# Patient Record
Sex: MALE | Race: ASIAN | NOT HISPANIC OR LATINO
[De-identification: names, ages, dates, MRNs, and addresses within clinical notes are randomized per-mention and may not be internally consistent; named-entity substitution may affect disease eponyms.]

---
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
## 2023-04-17 ENCOUNTER — APPOINTMENT (OUTPATIENT)
Dept: ORTHOPEDIC SURGERY | Facility: CLINIC | Age: 18
End: 2023-04-17
Payer: MEDICAID

## 2023-04-17 ENCOUNTER — NON-APPOINTMENT (OUTPATIENT)
Age: 18
End: 2023-04-17

## 2023-04-17 VITALS
SYSTOLIC BLOOD PRESSURE: 133 MMHG | HEIGHT: 67 IN | DIASTOLIC BLOOD PRESSURE: 91 MMHG | HEART RATE: 76 BPM | BODY MASS INDEX: 27.67 KG/M2 | WEIGHT: 176.31 LBS

## 2023-04-17 DIAGNOSIS — M25.569 PAIN IN UNSPECIFIED KNEE: ICD-10-CM

## 2023-04-17 PROBLEM — Z00.00 ENCOUNTER FOR PREVENTIVE HEALTH EXAMINATION: Status: ACTIVE | Noted: 2023-04-17

## 2023-04-17 PROCEDURE — 73560 X-RAY EXAM OF KNEE 1 OR 2: CPT | Mod: LT

## 2023-04-17 PROCEDURE — 99205 OFFICE O/P NEW HI 60 MIN: CPT

## 2023-04-17 NOTE — DISCUSSION/SUMMARY
[de-identified] : 17y healthy/active male pw L ACL tear, medial meniscus ramp lesion and possible lateral meniscus posterior horn tear.  The patient was extensively counseled on treatment options including but not limited to observation, rest/activity modification, bracing, anti-inflammatory medications, physical therapy, injections, and surgery.  The natural history of the disease was thoroughly explained.  \par \par The risks, benefits and alternatives to surgery were discussed.  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  He states that returning to [facility] is extremely important to him and would like to proceed with an ACL reconstruction.  Regarding the meniscus injury, we discussed that chondroprotective biomechanics of an intact meniscus.  We discussed that certain patterns of injuries are more amenable to repair given their location, blood supply, size and pattern.  We discussed that every attempt will be made to repair the meniscus should the injury be reparable.  We also discussed that irreparable injuries will be debrided to prevent mechanical symptoms but that deficiencies may accelerate arthritis and arthrosis.\par We discussed the distinct graft choices available.  I advised against allograft given the higher re-rupture rates in younger patients.  We discussed that autologous hamstring grafts may require allograft augmentation to reach a sufficient diameter and the reach if nerve injury upon harvest along with weakness in deep flexion.  We discussed potential extensor weakness following autologous quadriceps harvest.  We also discussed potential anterior knee pain, kneeling pain, and patellar fracture with bone-patellar-bone harvest.\par  The patient understands the risks include but are not limited to bleeding, infection, wound healing issues, damage to surrounding structures including nerves and arteries, the need for re-rupture of the ACL and tearing of the meniscus, revision surgery, symptomatic hardware, contralateral ACL rupture, and the inability of the surgery to reduce the pain.  No guarantees were given regarding the surgery.  They understand there is a risk of loss of limb, extremity and life.\par \par We discussed the proposed rehabilitation timeline as well as expected postoperative restrictions. The patient voiced a good understanding of treatment options, risks and benefits, postoperative instructions, rehabilitation timeline, and restrictions. The patient was given the opportunity to ask questions, which were all answered to the best of my ability and to their satisfaction. The patient will work with my office to arrange a time for surgery and obtain any medical clearance information necessary. The patient expressed understanding of his diagnosis and treatment plan and all questions were answered. \par \par He is returning to [place] for his final exams - we will begin prehab to restore full extension and I will re-evaluate him prior to surgery to finalize his graft decision and select a surgial date.\par \par -Hinged knee brace, crutches\par -rest/ice/elevate\par -nsaid prn\par -PT prehab\par -RTC 3w upon return from exams\par \par \par I have personally obtained the history, reviewed the ROS as noted, and performed the physical examination today.  The patient and I discussed the assessment and options and developed the plan.  All questions were answered and the patient stated their understanding of the treatment plan and appreciation of the visit.\par \par My cumulative time spent on this patient's visit was over 60 minutes and included: Preparation for the visit, review of the medical records, review of pertinent diagnostic studies, examination and counseling of the patient on the above diagnosis, treatment plan and prognosis, orders of diagnostic tests, surgical discussions, surgical booking, medications and/or appropriate procedures and documentation in the medical records of today's visit. \par \par Kenn Rojas MD

## 2023-04-17 NOTE — HISTORY OF PRESENT ILLNESS
[de-identified] : 18yo very pleasant male pw L knee pain from an injury last week.  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  He noted a popping sensation and immediate pain and swelling.  He initially thought it was a sprain but the level of swelling caused him to see his PMD, who ordered an MRI that revealed an ACL tear and a meniscus tear.  He has no history of knee or sports injuries, he has never had surgery.  He notes the pain is mild and he has been taking tylenol when needed.  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  He is returning to [place] for the next 2 weeks for his final exams.  No numbness/paresthesias.  He has not been using a brace.\par Referred by Dr. Bustillo.

## 2023-04-17 NOTE — PHYSICAL EXAM
[de-identified] : Gen: NAD\par Resp: Nonlabored respirations, no SOB\par Gait: antalgic\par \par L Knee:\par Skin intact\par Moderate effusion\par 10-90 AROM\par Tender MJL + LJL\par Firing IP Q EHL TA GS\par SILT L3-S1\par 2+ dp bcr\par \par 2B lachman and (+) pivot shift\par Grade 1 posterior drawer\par Stable to v/v at 0 and 30 degrees\par (-) Dial test at 30, 90 degrees\par \par (+) Ursula, unable to perform Thessaly\par \par 1+ patellar translation\par (-) pain with patellar compression/grind\par (-) J sign\par (-) apprehension  [de-identified] : The following radiographs were ordered and read by me during this patient's visit. I reviewed each radiograph in detail with the patient and discussed the findings as highlighted below.   2 views of the L knee were obtained today that show no fracture, or dislocation. There are no degenerative changes seen. There is no malalignment. No obvious osseous abnormality. Otherwise unremarkable. \par \par I independently reviewed and interpreted the MRI of the L knee from [facility] 4/14.  I discussed with the patient the MRI demonstrates a complete ACL tear with classic bone bruising, low grade sprain of the MCL and LCL, a ramp lesion of the medial meniscus and a possible posterior horn lateral meniscus tear.

## 2023-04-24 ENCOUNTER — APPOINTMENT (OUTPATIENT)
Dept: ORTHOPEDIC SURGERY | Facility: CLINIC | Age: 18
End: 2023-04-24
Payer: MEDICAID

## 2023-04-24 VITALS — SYSTOLIC BLOOD PRESSURE: 132 MMHG | HEART RATE: 82 BPM | DIASTOLIC BLOOD PRESSURE: 92 MMHG

## 2023-04-24 PROCEDURE — 99214 OFFICE O/P EST MOD 30 MIN: CPT

## 2023-04-27 NOTE — PHYSICAL EXAM
[de-identified] : Gen: NAD\par Resp: Nonlabored respirations, no SOB\par Gait: antalgic\par \par L Knee:\par Skin intact\par Moderate effusion\par 0-100 AROM\par Tender MJL + LJL\par Firing IP Q EHL TA GS\par SILT L3-S1\par 2+ dp bcr\par \par 2B lachman and (+) pivot shift\par Grade 1 posterior drawer\par Stable to v/v at 0 and 30 degrees\par (-) Dial test at 30, 90 degrees\par \par (+) Ursula, unable to perform Thessaly\par \par 1+ patellar translation\par (-) pain with patellar compression/grind\par (-) J sign\par (-) apprehension  [de-identified] : The following radiographs were ordered and read by me during this patient's visit. I reviewed each radiograph in detail with the patient and discussed the findings as highlighted below.   2 views of the L knee were obtained today that show no fracture, or dislocation. There are no degenerative changes seen. There is no malalignment. No obvious osseous abnormality. Otherwise unremarkable. \par \par I independently reviewed and interpreted the MRI of the L knee from [facility] 4/14.  I discussed with the patient the MRI demonstrates a complete ACL tear with classic bone bruising, low grade sprain of the MCL and LCL, a ramp lesion of the medial meniscus and a possible posterior horn lateral meniscus tear.

## 2023-04-27 NOTE — DISCUSSION/SUMMARY
[de-identified] : 17y healthy/active male pw L ACL tear, medial meniscus ramp lesion and possible lateral meniscus posterior horn tear.  The patient was extensively counseled on treatment options including but not limited to observation, rest/activity modification, bracing, anti-inflammatory medications, physical therapy, injections, and surgery.  The natural history of the disease was thoroughly explained.  \par \par The risks, benefits and alternatives to surgery were discussed.  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  He states that returning to [facility] is extremely important to him and would like to proceed with an ACL reconstruction.  Regarding the meniscus injury, we discussed that chondroprotective biomechanics of an intact meniscus.  We discussed that certain patterns of injuries are more amenable to repair given their location, blood supply, size and pattern.  We discussed that every attempt will be made to repair the meniscus should the injury be reparable.  We also discussed that irreparable injuries will be debrided to prevent mechanical symptoms but that deficiencies may accelerate arthritis and arthrosis.\par We discussed the distinct graft choices available.  I advised against allograft given the higher re-rupture rates in younger patients.  We discussed that autologous hamstring grafts may require allograft augmentation to reach a sufficient diameter and the reach if nerve injury upon harvest along with weakness in deep flexion.  We discussed potential extensor weakness following autologous quadriceps harvest.  We also discussed potential anterior knee pain, kneeling pain, and patellar fracture with bone-patellar-bone harvest.\par  The patient understands the risks include but are not limited to bleeding, infection, wound healing issues, damage to surrounding structures including nerves and arteries, re-rupture of the ACL and tearing of the meniscus, revision surgery, symptomatic hardware, contralateral ACL rupture, and the inability of the surgery to reduce the pain.  No guarantees were given regarding the surgery.  They understand there is a risk of loss of limb, extremity and life.\par \par We discussed the proposed rehabilitation timeline as well as expected postoperative restrictions. The patient voiced a good understanding of treatment options, risks and benefits, postoperative instructions, rehabilitation timeline, and restrictions. The patient was given the opportunity to ask questions, which were all answered to the best of my ability and to their satisfaction. The patient will work with my office to arrange a time for surgery and obtain any medical clearance information necessary. The patient expressed understanding of his diagnosis and treatment plan and all questions were answered. \par \par He is returning to [place] for his final exams - we will begin prehab to restore full extension and I will re-evaluate him prior to surgery to finalize his graft decision and select a surgical date.\par \par He would like to proceed with a left knee arthroscopic assisted anterior cruciate ligament reconstruction with quadriceps autograft, possible allograft, and meniscus repair versus debridement\par \par \par -Hinged knee brace, crutches\par -rest/ice/elevate\par -nsaid prn\par -PT prehab\par \par \par \par I have personally obtained the history, reviewed the ROS as noted, and performed the physical examination today.  The patient and I discussed the assessment and options and developed the plan.  All questions were answered and the patient stated their understanding of the treatment plan and appreciation of the visit.\par \par My cumulative time spent on this patient's visit was over 60 minutes and included: Preparation for the visit, review of the medical records, review of pertinent diagnostic studies, examination and counseling of the patient on the above diagnosis, treatment plan and prognosis, orders of diagnostic tests, surgical discussions, surgical booking, medications and/or appropriate procedures and documentation in the medical records of today's visit. \par \par Kenn Rojas MD

## 2023-04-27 NOTE — HISTORY OF PRESENT ILLNESS
[de-identified] : 18yo very pleasant male pw L knee pain from an injury last week.  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  He noted a popping sensation and immediate pain and swelling.  He initially thought it was a sprain but the level of swelling caused him to see his PMD, who ordered an MRI that revealed an ACL tear and a meniscus tear.  He has no history of knee or sports injuries, he has never had surgery.  He notes the pain is mild and he has been taking tylenol when needed.  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  He is returning to [place] for the next 2 weeks for his final exams.  No numbness/paresthesias.  He has not been using a brace.\par Referred by Dr. Bustillo.\par \par 4/24 interval - he returns having worked on his knee ROM and feels almost no pain today.  He has researched graft choices and has several questions.  He has been icing his knee

## 2023-05-01 ENCOUNTER — OUTPATIENT (OUTPATIENT)
Dept: OUTPATIENT SERVICES | Facility: HOSPITAL | Age: 18
LOS: 1 days | End: 2023-05-01
Payer: MEDICAID

## 2023-05-01 VITALS
TEMPERATURE: 98 F | HEART RATE: 76 BPM | RESPIRATION RATE: 14 BRPM | WEIGHT: 147.71 LBS | SYSTOLIC BLOOD PRESSURE: 106 MMHG | HEIGHT: 67.72 IN | OXYGEN SATURATION: 98 % | DIASTOLIC BLOOD PRESSURE: 72 MMHG

## 2023-05-01 DIAGNOSIS — Z01.818 ENCOUNTER FOR OTHER PREPROCEDURAL EXAMINATION: ICD-10-CM

## 2023-05-01 DIAGNOSIS — M25.569 PAIN IN UNSPECIFIED KNEE: ICD-10-CM

## 2023-05-01 PROCEDURE — G0463: CPT

## 2023-05-01 NOTE — H&P PST ADULT - ASSESSMENT
16 y/o male with left knee pain  Planned surgery.- left knee arthrosocpy  Pre op instructions provided-wash, NPO for surgery  Instructions provided on medications to continue 18 y/o male with left knee pain  Planned surgery.- left knee arthroscopy  Pre op instructions provided-wash, NPO for surgery  Instructions provided on medications to continue

## 2023-05-01 NOTE — H&P PST ADULT - HISTORY OF PRESENT ILLNESS
18 y/o male with left knee injury while playing sports at school 1 month ago. Pain since then. Pain with activities with weight bearing and not taking any pain meds. MRI revealed Medial meniscus tear and was advised surgery

## 2023-05-01 NOTE — H&P PST ADULT - ATTENDING COMMENTS
17y healthy male presenting with left knee anterior cruciate ligament tear and likely medial meniscus tear.  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  On exam, 2B lachman (+) pivot shift, otherwise ligamentously intact, (+) [facility], NVI.  Plan for left knee anterior cruciate ligament reconstruction with quadriceps autograft, possible allograft, meniscus repair versus debridement

## 2023-05-01 NOTE — H&P PST ADULT - TEMPERATURE IN CELSIUS (DEGREES C)
Impression: Type 2 diabetes mellitus w/o complication: T52.8. Plan: Diabetes type II: no background retinopathy, no signs of neovascularization noted. Discussed ocular and systemic benefits of blood sugar control. 36.7

## 2023-05-04 ENCOUNTER — TRANSCRIPTION ENCOUNTER (OUTPATIENT)
Age: 18
End: 2023-05-04

## 2023-05-05 ENCOUNTER — APPOINTMENT (OUTPATIENT)
Dept: ORTHOPEDIC SURGERY | Facility: HOSPITAL | Age: 18
End: 2023-05-05

## 2023-05-05 ENCOUNTER — OUTPATIENT (OUTPATIENT)
Dept: OUTPATIENT SERVICES | Facility: HOSPITAL | Age: 18
LOS: 1 days | End: 2023-05-05
Payer: COMMERCIAL

## 2023-05-05 ENCOUNTER — TRANSCRIPTION ENCOUNTER (OUTPATIENT)
Age: 18
End: 2023-05-05

## 2023-05-05 VITALS
OXYGEN SATURATION: 96 % | HEART RATE: 77 BPM | DIASTOLIC BLOOD PRESSURE: 83 MMHG | SYSTOLIC BLOOD PRESSURE: 130 MMHG | RESPIRATION RATE: 16 BRPM

## 2023-05-05 VITALS
OXYGEN SATURATION: 99 % | DIASTOLIC BLOOD PRESSURE: 80 MMHG | RESPIRATION RATE: 221 BRPM | SYSTOLIC BLOOD PRESSURE: 128 MMHG | HEIGHT: 69 IN | HEART RATE: 64 BPM | TEMPERATURE: 98 F | WEIGHT: 167.33 LBS

## 2023-05-05 DIAGNOSIS — M25.569 PAIN IN UNSPECIFIED KNEE: ICD-10-CM

## 2023-05-05 PROCEDURE — 97161 PT EVAL LOW COMPLEX 20 MIN: CPT

## 2023-05-05 PROCEDURE — 76000 FLUOROSCOPY <1 HR PHYS/QHP: CPT

## 2023-05-05 PROCEDURE — 29882 ARTHRS KNE SRG MNISC RPR M/L: CPT | Mod: LT

## 2023-05-05 PROCEDURE — 29888 ARTHRS AID ACL RPR/AGMNTJ: CPT | Mod: LT

## 2023-05-05 PROCEDURE — 29888 ARTHRS AID ACL RPR/AGMNTJ: CPT | Mod: AS,LT

## 2023-05-05 PROCEDURE — C1713: CPT

## 2023-05-05 DEVICE — PIN GUIDE FLEX MUST ORDER IN MULT OF 5: Type: IMPLANTABLE DEVICE | Status: FUNCTIONAL

## 2023-05-05 DEVICE — ARTHREX SECONDARY FIXATION WITH PEEK SWIVELOCK ANCHOR 4.75 X 19.1MM: Type: IMPLANTABLE DEVICE | Status: FUNCTIONAL

## 2023-05-05 DEVICE — IMP TIGHTROPE ACL W/FIBERTAG: Type: IMPLANTABLE DEVICE | Status: FUNCTIONAL

## 2023-05-05 DEVICE — IMP TIGHTROPE ABS BUTTON 14MM: Type: IMPLANTABLE DEVICE | Status: FUNCTIONAL

## 2023-05-05 DEVICE — ANCHOR SUT FIBERSTITCH 2-0 CRVD: Type: IMPLANTABLE DEVICE | Status: FUNCTIONAL

## 2023-05-05 RX ORDER — IBUPROFEN 200 MG
1 TABLET ORAL
Qty: 30 | Refills: 0
Start: 2023-05-05

## 2023-05-05 RX ORDER — OXYCODONE HYDROCHLORIDE 5 MG/1
5 TABLET ORAL ONCE
Refills: 0 | Status: DISCONTINUED | OUTPATIENT
Start: 2023-05-05 | End: 2023-05-08

## 2023-05-05 RX ORDER — HYDROMORPHONE HYDROCHLORIDE 2 MG/ML
0.5 INJECTION INTRAMUSCULAR; INTRAVENOUS; SUBCUTANEOUS
Refills: 0 | Status: DISCONTINUED | OUTPATIENT
Start: 2023-05-05 | End: 2023-05-08

## 2023-05-05 RX ORDER — CEFAZOLIN SODIUM 1 G
2000 VIAL (EA) INJECTION ONCE
Refills: 0 | Status: COMPLETED | OUTPATIENT
Start: 2023-05-05 | End: 2023-05-05

## 2023-05-05 RX ORDER — OXYCODONE HYDROCHLORIDE 5 MG/1
10 TABLET ORAL ONCE
Refills: 0 | Status: DISCONTINUED | OUTPATIENT
Start: 2023-05-05 | End: 2023-05-05

## 2023-05-05 RX ORDER — APREPITANT 80 MG/1
40 CAPSULE ORAL ONCE
Refills: 0 | Status: COMPLETED | OUTPATIENT
Start: 2023-05-05 | End: 2023-05-05

## 2023-05-05 RX ORDER — HYDROCODONE BITARTRATE AND ACETAMINOPHEN 7.5; 325 MG/15ML; MG/15ML
1 SOLUTION ORAL
Qty: 28 | Refills: 0
Start: 2023-05-05 | End: 2023-05-11

## 2023-05-05 RX ORDER — HYDROMORPHONE HYDROCHLORIDE 2 MG/ML
0.25 INJECTION INTRAMUSCULAR; INTRAVENOUS; SUBCUTANEOUS
Refills: 0 | Status: DISCONTINUED | OUTPATIENT
Start: 2023-05-05 | End: 2023-05-08

## 2023-05-05 RX ORDER — GABAPENTIN 400 MG/1
1 CAPSULE ORAL
Qty: 42 | Refills: 0
Start: 2023-05-05 | End: 2023-05-18

## 2023-05-05 RX ORDER — ASPIRIN/CALCIUM CARB/MAGNESIUM 324 MG
1 TABLET ORAL
Qty: 56 | Refills: 0
Start: 2023-05-05 | End: 2023-06-01

## 2023-05-05 RX ORDER — SODIUM CHLORIDE 9 MG/ML
1000 INJECTION, SOLUTION INTRAVENOUS
Refills: 0 | Status: DISCONTINUED | OUTPATIENT
Start: 2023-05-05 | End: 2023-05-08

## 2023-05-05 RX ADMIN — SODIUM CHLORIDE 100 MILLILITER(S): 9 INJECTION, SOLUTION INTRAVENOUS at 11:48

## 2023-05-05 RX ADMIN — OXYCODONE HYDROCHLORIDE 10 MILLIGRAM(S): 5 TABLET ORAL at 12:16

## 2023-05-05 RX ADMIN — HYDROMORPHONE HYDROCHLORIDE 0.25 MILLIGRAM(S): 2 INJECTION INTRAMUSCULAR; INTRAVENOUS; SUBCUTANEOUS at 11:55

## 2023-05-05 RX ADMIN — OXYCODONE HYDROCHLORIDE 10 MILLIGRAM(S): 5 TABLET ORAL at 12:46

## 2023-05-05 RX ADMIN — APREPITANT 40 MILLIGRAM(S): 80 CAPSULE ORAL at 07:06

## 2023-05-05 RX ADMIN — HYDROMORPHONE HYDROCHLORIDE 0.25 MILLIGRAM(S): 2 INJECTION INTRAMUSCULAR; INTRAVENOUS; SUBCUTANEOUS at 12:18

## 2023-05-05 NOTE — PHYSICAL THERAPY INITIAL EVALUATION ADULT - DID THE PATIENT HAVE SURGERY?
left knee arthroscopic assisted anterior cruciate ligament reconstruction with quadriceps autograft, possible allograft, meniscus repair vs debridement/yes

## 2023-05-05 NOTE — PHYSICAL THERAPY INITIAL EVALUATION ADULT - NSACTIVITYREC_GEN_A_PT
Pt independent in bed mobility, requires CG x 1 for transfers, Sup x 1 for amb with crutches. Pt edu/instructed on L LE NWB. Although pt does not have stairs, pt instructed on going up/down stairs on bottom. Pt NWB with crutches education handout issued. Pt verbalized family available to assist as needed.

## 2023-05-05 NOTE — PHYSICAL THERAPY INITIAL EVALUATION ADULT - PERTINENT HX OF CURRENT PROBLEM, REHAB EVAL
Pt is a 18 y/o M s/p left knee arthroscopic assisted anterior cruciate ligament reconstruction with quadriceps autograft, possible allograft, meniscus repair vs debridement 5/5/23

## 2023-05-05 NOTE — ASU DISCHARGE PLAN (ADULT/PEDIATRIC) - ASU DC SPECIAL INSTRUCTIONSFT
Please call Dr. Rojas's office for post operative appointment  Left lower extremity is non weight bearing- keep leg in knee immobilizer

## 2023-05-05 NOTE — ASU PATIENT PROFILE, ADULT - FALL HARM RISK - RISK INTERVENTIONS

## 2023-05-05 NOTE — PHYSICAL THERAPY INITIAL EVALUATION ADULT - ADDITIONAL COMMENTS
Pt resides in pvt home with parents and sister, pt verbalized available to assist as needed. Pt denies MARION and stairs throughout household. Pt has crutches, states was using briefly after injury. Pt reports was independent prior to admission.

## 2023-05-05 NOTE — ASU DISCHARGE PLAN (ADULT/PEDIATRIC) - NS MD DC FALL RISK RISK
For information on Fall & Injury Prevention, visit: [email or web address] OR  [email or web address] OR  https://www.cdc.gov/steadi/patient.html

## 2023-05-05 NOTE — BRIEF OPERATIVE NOTE - NSICDXBRIEFPROCEDURE_GEN_ALL_CORE_FT
PROCEDURES:  Arthroscopic repair of ACL of left knee using patellar tendon graft 05-May-2023 11:27:30  Lara Dickson

## 2023-05-18 ENCOUNTER — APPOINTMENT (OUTPATIENT)
Dept: ORTHOPEDIC SURGERY | Facility: CLINIC | Age: 18
End: 2023-05-18
Payer: COMMERCIAL

## 2023-05-18 VITALS — HEART RATE: 89 BPM | SYSTOLIC BLOOD PRESSURE: 111 MMHG | DIASTOLIC BLOOD PRESSURE: 75 MMHG | TEMPERATURE: 97.2 F

## 2023-05-18 PROBLEM — Z87.828 PERSONAL HISTORY OF OTHER (HEALED) PHYSICAL INJURY AND TRAUMA: Chronic | Status: ACTIVE | Noted: 2023-05-01

## 2023-05-18 PROCEDURE — 99024 POSTOP FOLLOW-UP VISIT: CPT

## 2023-05-18 PROCEDURE — 73560 X-RAY EXAM OF KNEE 1 OR 2: CPT | Mod: LT

## 2023-05-18 NOTE — HISTORY OF PRESENT ILLNESS
[de-identified] : 18yo very pleasant male pw L knee pain from an injury last week.  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  He noted a popping sensation and immediate pain and swelling.  He initially thought it was a sprain but the level of swelling caused him to see his PMD, who ordered an MRI that revealed an ACL tear and a meniscus tear.  He has no history of knee or sports injuries, he has never had surgery.  He notes the pain is mild and he has been taking tylenol when needed.  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  He is returning to [place] for the next 2 weeks for his final exams.  No numbness/paresthesias.  He has not been using a brace.\par Referred by Dr. Bustillo.\par \par 4/24 interval - he returns having worked on his knee ROM and feels almost no pain today.  He has researched graft choices and has several questions.  He has been icing his knee\par \par 5/18 interval - Pt returns after quad ACLR + mm repair stating the pain has gradually decreased each day.  Pt denies f/c, cp/sob, numbness/paresthesias, has followed postop instructions regarding rom and weight bearing status, has weaned prescription pain meds almost completely.  No issues with the dressing or wound.

## 2023-05-18 NOTE — PHYSICAL EXAM
[de-identified] : Gen: NAD\par Resp: Nonlabored respirations, no SOB\par Gait: antalgic\par \par L Knee:\par dressing removed and incision healing well, no erythema/fluctuance/purulence/streaking - steristrips placed \par Small effusion\par 10-90 AROM\par Firing IP Q EHL TA GS\par SILT L3-S1\par 2+ dp bcr [de-identified] : The following radiographs were ordered and read by me during this patient's visit. I reviewed each radiograph in detail with the patient and discussed the findings as highlighted below.   2 views of the L knee were obtained today that show no fracture, or dislocation. There are no degenerative changes seen. The tibial and femoral tunnels appear in appropriate location with interference buttons against the anteromedial tibia with an anchor distal to the button and on the lateral femur.\par \par I independently reviewed and interpreted the MRI of the L knee from [facility] 4/14.  I discussed with the patient the MRI demonstrates a complete ACL tear with classic bone bruising, low grade sprain of the MCL and LCL, a ramp lesion of the medial meniscus and a possible posterior horn lateral meniscus tear.

## 2023-05-18 NOTE — DISCUSSION/SUMMARY
[de-identified] : 17y healthy/active male pw L ACL tear, medial meniscus ramp lesion sp autologous quadriceps ACLR + mm repair 5/5.  He is doing well and will begin PT today.\par \par -Hinged knee brace, crutches - NWB 4w\par -rest/ice/elevate\par -nsaid prn\par -RTC 4w\par \par \par I have personally obtained the history, reviewed the ROS as noted, and performed the physical examination today.  The patient and I discussed the assessment and options and developed the plan.  All questions were answered and the patient stated their understanding of the treatment plan and appreciation of the visit.\par \par My cumulative time spent on this patient's visit was over 60 minutes and included: Preparation for the visit, review of the medical records, review of pertinent diagnostic studies, examination and counseling of the patient on the above diagnosis, treatment plan and prognosis, orders of diagnostic tests, surgical discussions, surgical booking, medications and/or appropriate procedures and documentation in the medical records of today's visit. \par \par Kenn Rojas MD

## 2023-06-15 ENCOUNTER — APPOINTMENT (OUTPATIENT)
Dept: ORTHOPEDIC SURGERY | Facility: CLINIC | Age: 18
End: 2023-06-15
Payer: MEDICAID

## 2023-06-15 VITALS — DIASTOLIC BLOOD PRESSURE: 79 MMHG | HEART RATE: 76 BPM | HEIGHT: 67 IN | SYSTOLIC BLOOD PRESSURE: 115 MMHG

## 2023-06-15 PROCEDURE — 99024 POSTOP FOLLOW-UP VISIT: CPT

## 2023-06-15 PROCEDURE — 73560 X-RAY EXAM OF KNEE 1 OR 2: CPT | Mod: LT

## 2023-06-15 NOTE — HISTORY OF PRESENT ILLNESS
[de-identified] : 18yo very pleasant male pw L knee pain from an injury last week.  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  He noted a popping sensation and immediate pain and swelling.  He initially thought it was a sprain but the level of swelling caused him to see his PMD, who ordered an MRI that revealed an ACL tear and a meniscus tear.  He has no history of knee or sports injuries, he has never had surgery.  He notes the pain is mild and he has been taking tylenol when needed.  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  He is returning to [place] for the next 2 weeks for his final exams.  No numbness/paresthesias.  He has not been using a brace.\par Referred by Dr. Bustillo.\par \par 4/24 interval - he returns having worked on his knee ROM and feels almost no pain today.  He has researched graft choices and has several questions.  He has been icing his knee\par \par 5/18 interval - Pt returns after quad ACLR + mm repair stating the pain has gradually decreased each day.  Pt denies f/c, cp/sob, numbness/paresthesias, has followed postop instructions regarding rom and weight bearing status, has weaned prescription pain meds almost completely.  No issues with the dressing or wound. \par \par 6/15 interval - he has been going to PT 5x/w, he has no pain and feels his quadriceps is much stronger, not taking any medications.  He is going to [place] for 1 month at the end of the month for his sister's wedding

## 2023-06-15 NOTE — PHYSICAL EXAM
[de-identified] : Gen: NAD\par Resp: Nonlabored respirations, no SOB\par Gait: crutches \par \par L Knee:\par skin healed\par No effusion\par 0-110 AROM\par 5/5 IP Q EHL TA GS\par SILT L3-S1\par 2+ dp bcr\par \par 1A lachman and (-) pivot shift\par Grade 1 posterior drawer\par Stable to v/v at 0 and 30 degrees\par (-) Dial test at 30, 90 degrees\par \par (-) Ursula, Thessaly\par \par 1+ patellar translation\par (-) pain with patellar compression/grind\par (-) J sign\par (-) apprehension  [de-identified] : The following radiographs were ordered and read by me during this patient's visit. I reviewed each radiograph in detail with the patient and discussed the findings as highlighted below.   2 views of the L knee were obtained today that show no fracture, or dislocation. There are no degenerative changes seen. The tibial and femoral tunnels appear in appropriate location with interference buttons against the anteromedial tibia with a cortical fixation button 1mm from the lateral femur.\par \par I independently reviewed and interpreted the MRI of the L knee from [facility] 4/14.  I discussed with the patient the MRI demonstrates a complete ACL tear with classic bone bruising, low grade sprain of the MCL and LCL, a ramp lesion of the medial meniscus and a possible posterior horn lateral meniscus tear.

## 2023-06-15 NOTE — DISCUSSION/SUMMARY
[de-identified] : 17y healthy/active male pw L ACL tear, medial meniscus ramp lesion sp autologous quadriceps ACLR + mm repair 5/5.  He is doing well with good strength and rom, no pain.  He is going to [place] for 1 month and his mom has located a physical therapist near the site of his sister's wedding, he will f/u upon his return.\par \par -Hinged knee brace - dc when appropriate quad control\par -adv to WBAT\par -rest/ice/elevate\par -nsaid prn\par -RTC after trip to [place]\par \par \par I have personally obtained the history, reviewed the ROS as noted, and performed the physical examination today.  The patient and I discussed the assessment and options and developed the plan.  All questions were answered and the patient stated their understanding of the treatment plan and appreciation of the visit.\par \par My cumulative time spent on this patient's visit was over 60 minutes and included: Preparation for the visit, review of the medical records, review of pertinent diagnostic studies, examination and counseling of the patient on the above diagnosis, treatment plan and prognosis, orders of diagnostic tests, surgical discussions, surgical booking, medications and/or appropriate procedures and documentation in the medical records of today's visit. \par \par Kenn Rojas MD

## 2023-08-21 ENCOUNTER — APPOINTMENT (OUTPATIENT)
Dept: ORTHOPEDIC SURGERY | Facility: CLINIC | Age: 18
End: 2023-08-21

## 2023-09-05 ENCOUNTER — APPOINTMENT (OUTPATIENT)
Dept: ORTHOPEDIC SURGERY | Facility: CLINIC | Age: 18
End: 2023-09-05
Payer: COMMERCIAL

## 2023-09-05 VITALS
WEIGHT: 176 LBS | BODY MASS INDEX: 27.62 KG/M2 | HEIGHT: 67 IN | DIASTOLIC BLOOD PRESSURE: 72 MMHG | SYSTOLIC BLOOD PRESSURE: 116 MMHG | HEART RATE: 76 BPM

## 2023-09-05 PROCEDURE — 99213 OFFICE O/P EST LOW 20 MIN: CPT

## 2023-09-05 NOTE — HISTORY OF PRESENT ILLNESS
[de-identified] : 16yo very pleasant male pw L knee pain from an injury last week.  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  He noted a popping sensation and immediate pain and swelling.  He initially thought it was a sprain but the level of swelling caused him to see his PMD, who ordered an MRI that revealed an ACL tear and a meniscus tear.  He has no history of knee or sports injuries, he has never had surgery.  He notes the pain is mild and he has been taking tylenol when needed.  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  He is returning to [place] for the next 2 weeks for his final exams.  No numbness/paresthesias.  He has not been using a brace. Referred by Dr. Bustillo.  4/24 interval - he returns having worked on his knee ROM and feels almost no pain today.  He has researched graft choices and has several questions.  He has been icing his knee  5/18 interval - Pt returns after quad ACLR + mm repair stating the pain has gradually decreased each day.  Pt denies f/c, cp/sob, numbness/paresthesias, has followed postop instructions regarding rom and weight bearing status, has weaned prescription pain meds almost completely.  No issues with the dressing or wound.   6/15 interval - he has been going to PT 5x/w, he has no pain and feels his quadriceps is much stronger, not taking any medications.  He is going to [place] for 1 month at the end of the month for his sister's wedding  9/5 interval - he has intermittently done some PT but this was interrupted by his month long trip to [place] for his sister's wedding.  He has no pain but he does note some weakness in his leg.  He has started his semester at [employer] and is doing 2x/w PT there

## 2023-09-05 NOTE — DISCUSSION/SUMMARY
[de-identified] : 17y healthy/active male pw L ACL tear, medial meniscus ramp lesion sp autologous quadriceps ACLR + mm repair 5/5.  He is doing well with good strength and rom, no pain.  His PT was interrupted by a 1 month trip to [place] but he has returned to consistent rehab at college and understands the importance of completing this to restore his strenth and stability.  -PT rx renewed -rtc at winter break when home from college, he will contact my office with any interval issues   I have personally obtained the history, reviewed the ROS as noted, and performed the physical examination today.  The patient and I discussed the assessment and options and developed the plan.  All questions were answered and the patient stated their understanding of the treatment plan and appreciation of the visit.  My cumulative time spent on this patient's visit was over 60 minutes and included: Preparation for the visit, review of the medical records, review of pertinent diagnostic studies, examination and counseling of the patient on the above diagnosis, treatment plan and prognosis, orders of diagnostic tests, surgical discussions, surgical booking, medications and/or appropriate procedures and documentation in the medical records of today's visit.   Kenn Rojas MD

## 2023-09-05 NOTE — PHYSICAL EXAM
[de-identified] : Gen: NAD Resp: Nonlabored respirations, no SOB Gait: crutches   L Knee: skin healed No effusion 0-125 AROM 5/5 IP Q EHL TA GS SILT L3-S1 2+ dp bcr  1A lachman and (-) pivot shift Grade 1 posterior drawer Stable to v/v at 0 and 30 degrees (-) Dial test at 30, 90 degrees  (-) Ursula, [place] 2 leg squat  1+ patellar translation (-) pain with patellar compression/grind (-) J sign (-) apprehension  [de-identified] : The following radiographs were ordered and read by me during this patient's visit. I reviewed each radiograph in detail with the patient and discussed the findings as highlighted below.   2 views of the L knee were obtained today that show no fracture, or dislocation. There are no degenerative changes seen. The tibial and femoral tunnels appear in appropriate location with interference buttons against the anteromedial tibia with a cortical fixation button 1mm from the lateral femur.\par  \par  I independently reviewed and interpreted the MRI of the L knee from [facility] 4/14.  I discussed with the patient the MRI demonstrates a complete ACL tear with classic bone bruising, low grade sprain of the MCL and LCL, a ramp lesion of the medial meniscus and a possible posterior horn lateral meniscus tear.

## 2023-11-22 ENCOUNTER — APPOINTMENT (OUTPATIENT)
Dept: ORTHOPEDIC SURGERY | Facility: CLINIC | Age: 18
End: 2023-11-22
Payer: COMMERCIAL

## 2023-11-22 DIAGNOSIS — S83.249A OTHER TEAR OF MEDIAL MENISCUS, CURRENT INJURY, UNSPECIFIED KNEE, INITIAL ENCOUNTER: ICD-10-CM

## 2023-11-22 PROCEDURE — 99213 OFFICE O/P EST LOW 20 MIN: CPT

## 2024-03-13 ENCOUNTER — APPOINTMENT (OUTPATIENT)
Dept: ORTHOPEDIC SURGERY | Facility: CLINIC | Age: 19
End: 2024-03-13
Payer: COMMERCIAL

## 2024-03-13 DIAGNOSIS — S83.519A SPRAIN OF ANTERIOR CRUCIATE LIGAMENT OF UNSPECIFIED KNEE, INITIAL ENCOUNTER: ICD-10-CM

## 2024-03-13 PROCEDURE — 99213 OFFICE O/P EST LOW 20 MIN: CPT

## 2024-03-13 NOTE — HISTORY OF PRESENT ILLNESS
[de-identified] : 18yo very pleasant male pw L knee pain from an injury last week.  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  He noted a popping sensation and immediate pain and swelling.  He initially thought it was a sprain but the level of swelling caused him to see his PMD, who ordered an MRI that revealed an ACL tear and a meniscus tear.  He has no history of knee or sports injuries, he has never had surgery.  He notes the pain is mild and he has been taking tylenol when needed.  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  He is returning to [place] for the next 2 weeks for his final exams.  No numbness/paresthesias.  He has not been using a brace. Referred by Dr. Bustillo.  4/24 interval - he returns having worked on his knee ROM and feels almost no pain today.  He has researched graft choices and has several questions.  He has been icing his knee  5/18 interval - Pt returns after quad ACLR + mm repair stating the pain has gradually decreased each day.  Pt denies f/c, cp/sob, numbness/paresthesias, has followed postop instructions regarding rom and weight bearing status, has weaned prescription pain meds almost completely.  No issues with the dressing or wound.   6/15 interval - he has been going to PT 5x/w, he has no pain and feels his quadriceps is much stronger, not taking any medications.  He is going to [place] for 1 month at the end of the month for his sister's wedding  9/5 interval - he has intermittently done some PT but this was interrupted by his month long trip to [place] for his sister's wedding.  He has no pain but he does note some weakness in his leg.  He has started his semester at [employer] and is doing 2x/w PT there  11/22 interval - he has been doing consistent 1x/w PT at college and has done some light jogging, no agility work but he is progressing well  3/13 interval - he feels his quad strength has improved significantly with 20 more sessions of PT at [facility], no pain or buckling, no swelling - feels close to fully recovered

## 2024-03-13 NOTE — DISCUSSION/SUMMARY
[de-identified] : 18y healthy/active male pw L ACL tear, medial meniscus ramp lesion sp autologous quadriceps ACLR + mm repair 5/5.  He is doing well with good strength and rom, no pain.  His PT was interrupted by a 1 month trip to [place] but he has returned to consistent rehab at college and understands the importance of completing this to restore his strength and stability. He has made great progress with PT in the last 4 months and he has good strength and stability - cleared to return to [facility].  -PT/HEP -rtc 3-4m   I have personally obtained the history, reviewed the ROS as noted, and performed the physical examination today.  The patient and I discussed the assessment and options and developed the plan.  All questions were answered and the patient stated their understanding of the treatment plan and appreciation of the visit.  My cumulative time spent on this patient's visit was over 60 minutes and included: Preparation for the visit, review of the medical records, review of pertinent diagnostic studies, examination and counseling of the patient on the above diagnosis, treatment plan and prognosis, orders of diagnostic tests, surgical discussions, surgical booking, medications and/or appropriate procedures and documentation in the medical records of today's visit.   Kenn Rojas MD

## 2024-03-13 NOTE — PHYSICAL EXAM
[de-identified] : Gen: NAD Resp: Nonlabored respirations, no SOB Gait: crutches   L Knee: skin healed No effusion 0-130 AROM 5/5 IP Q EHL TA GS SILT L3-S1 2+ dp bcr  1A lachman and (-) pivot shift Grade 1 posterior drawer Stable to v/v at 0 and 30 degrees (-) Dial test at 30, 90 degrees  (-) Ursula, [place] Stable 2 leg squat, stable 1 leg squat Near equivalent 1 leg hop, 3 leg hop  1+ patellar translation (-) pain with patellar compression/grind (-) J sign (-) apprehension  [de-identified] : The following radiographs were ordered and read by me during this patient's visit. I reviewed each radiograph in detail with the patient and discussed the findings as highlighted below.   2 views of the L knee were obtained today that show no fracture, or dislocation. There are no degenerative changes seen. The tibial and femoral tunnels appear in appropriate location with interference buttons against the anteromedial tibia with a cortical fixation button 1mm from the lateral femur.\par  \par  I independently reviewed and interpreted the MRI of the L knee from [facility] 4/14.  I discussed with the patient the MRI demonstrates a complete ACL tear with classic bone bruising, low grade sprain of the MCL and LCL, a ramp lesion of the medial meniscus and a possible posterior horn lateral meniscus tear.

## (undated) DEVICE — SHAVER BLADE GREAT WHITE 4.2MM

## (undated) DEVICE — TUBING DEPUY MITEK FMS VUE INFLOW

## (undated) DEVICE — DRAPE C ARM MINI

## (undated) DEVICE — BLADE TENDON STRIPPER 9MM

## (undated) DEVICE — ARTHREX KIT ACL TRANSTIBIAL WITHOUT SAW BLADE

## (undated) DEVICE — KNOT PUSHER WITH PORTAL SKID

## (undated) DEVICE — DRAPE U POUCH 35X30"

## (undated) DEVICE — SHAVER BLADE LINVATEC ULTRAFRR 3.5MM

## (undated) DEVICE — S&N ARTHROCARE WAND COBLATION WEREWOLF FLOW 50

## (undated) DEVICE — POSITIONER CARDIAC BUMP

## (undated) DEVICE — DRAPE 3/4 SHEET 52X76"

## (undated) DEVICE — BLADE SCALPEL SAFETYLOCK #15

## (undated) DEVICE — SUT FIBERINK WITH LOOP 1.3MM (WHITE /BLUE)

## (undated) DEVICE — NDL SPINAL 18G X 3.5" (PINK)

## (undated) DEVICE — SUT TAPE TIGERLOOP W NDL WHITE / BLACK